# Patient Record
Sex: MALE | Race: WHITE | NOT HISPANIC OR LATINO | URBAN - METROPOLITAN AREA
[De-identification: names, ages, dates, MRNs, and addresses within clinical notes are randomized per-mention and may not be internally consistent; named-entity substitution may affect disease eponyms.]

---

## 2024-08-13 ENCOUNTER — EMERGENCY (EMERGENCY)
Facility: HOSPITAL | Age: 54
LOS: 1 days | End: 2024-08-13
Attending: EMERGENCY MEDICINE
Payer: SELF-PAY

## 2024-08-13 VITALS
HEART RATE: 70 BPM | SYSTOLIC BLOOD PRESSURE: 175 MMHG | TEMPERATURE: 99 F | HEIGHT: 65 IN | DIASTOLIC BLOOD PRESSURE: 90 MMHG | OXYGEN SATURATION: 98 % | RESPIRATION RATE: 18 BRPM | WEIGHT: 154.98 LBS

## 2024-08-13 PROCEDURE — 99283 EMERGENCY DEPT VISIT LOW MDM: CPT

## 2024-08-13 PROCEDURE — 99282 EMERGENCY DEPT VISIT SF MDM: CPT
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
## 2024-08-13 NOTE — ED PROVIDER NOTE - PHYSICAL EXAMINATION
Gen: No acute distress, non toxic  Head: NCAT  Eyes: pink conjunctivae, EOMI, PERRL  CV: RRR, nl s1/s2.  Resp: CTAB, normal rate and effort  Neuro: A&O x 3, sensorimotor intact without deficits   MSK: No midline c/t/l spine ttp or step offs. Full ROM c-spine and ext x 4. FWB and ambulatory with steady gait  Skin: No rashes. intact and perfused. No seatbelt sign, no ecchymosis

## 2024-08-13 NOTE — ED ADULT NURSE NOTE - NSFALLUNIVINTERV_ED_ALL_ED
Bed/Stretcher in lowest position, wheels locked, appropriate side rails in place/Call bell, personal items and telephone in reach/Instruct patient to call for assistance before getting out of bed/chair/stretcher/Non-slip footwear applied when patient is off stretcher/[place] to call system/Physically safe environment - no spills, clutter or unnecessary equipment/Purposeful proactive rounding/Room/bathroom lighting operational, light cord in reach

## 2024-08-13 NOTE — ED ADULT NURSE NOTE - ISOLATION TYPE:
HPI  Esperanza Spangler Sin 34 y.o. male  presents to the office today for CPE. Pt is not fasting. Blood pressure 128/76, pulse (!) 50, temperature 98.4 °F (36.9 °C), temperature source Temporal, resp. rate 16, height 5' 9\" (1.753 m), weight 153 lb (69.4 kg), SpO2 99 %. Body mass index is 22.59 kg/m². Chief Complaint   Patient presents with    Complete Physical      CPE: Pt presents today for a CPE, which I performed. All findings were normal.     Tinea versicolor: Pt has rash on chest that has been present for several years. Attention deficit: Pt reports attention deficit. He has difficulty focusing on things around the house. Health Maintenance:   Pt's unsure when his last tdap was (likely 10 years ago)-- will get booster today. Pt plans to get flu vaccine through his work this month. Pt had COVID booster on 10/20/21 ([facility]). I recommended he get the new booster. Current Outpatient Medications   Medication Sig Dispense Refill    ciclopirox (LOPROX) 0.77 % topical cream Apply  to affected area two (2) times a day. 90 g 2    Cetirizine (ZyrTEC) 10 mg cap Take  by mouth.      multivitamin (ONE A DAY) tablet Take 1 Tab by mouth daily. fish oil-omega-3 fatty acids (Fish Oil) 300-500 mg cap Take  by mouth. No Known Allergies  Past Medical History:   Diagnosis Date    Allergic rhinitis      History reviewed. No pertinent surgical history. Family History   Problem Relation Age of Onset    Elevated Lipids Mother     Asthma Mother     OSTEOARTHRITIS Mother     Elevated Lipids Father     Heart Disease Father      Social History     Tobacco Use    Smoking status: Never    Smokeless tobacco: Never   Substance Use Topics    Alcohol use: Yes     Alcohol/week: 4.0 standard drinks     Types: 1 Glasses of wine, 3 Cans of beer per week        Review of Systems   Constitutional:  Negative for chills and fever. HENT:  Negative for hearing loss and tinnitus.     Eyes:  Negative for blurred vision and double vision. Respiratory:  Negative for cough and shortness of breath. Cardiovascular:  Negative for chest pain and palpitations. Gastrointestinal:  Negative for nausea and vomiting. Genitourinary:  Negative for dysuria and frequency. Musculoskeletal:  Negative for back pain and falls. Skin:  Positive for rash. Negative for itching. Neurological:  Negative for dizziness, loss of consciousness and headaches. Endo/Heme/Allergies: Negative. Psychiatric/Behavioral:  Negative for depression. The patient is not nervous/anxious. Physical Exam  Vitals reviewed. Constitutional:       Appearance: Normal appearance. HENT:      Head: Normocephalic and atraumatic. Right Ear: Tympanic membrane, ear canal and external ear normal.      Left Ear: Tympanic membrane, ear canal and external ear normal.      Nose: Nose normal.      Mouth/Throat:      Mouth: Mucous membranes are moist.      Pharynx: Oropharynx is clear. Eyes:      Extraocular Movements: Extraocular movements intact. Conjunctiva/sclera: Conjunctivae normal.      Pupils: Pupils are equal, round, and reactive to light. Cardiovascular:      Rate and Rhythm: Normal rate and regular rhythm. Pulses: Normal pulses. Heart sounds: Normal heart sounds. Pulmonary:      Effort: Pulmonary effort is normal.      Breath sounds: Normal breath sounds. Abdominal:      General: Abdomen is flat. Bowel sounds are normal.      Palpations: Abdomen is soft. Musculoskeletal:         General: Normal range of motion. Cervical back: Normal range of motion and neck supple. Skin:     General: Skin is warm and dry. Comments: Pt has a hypopigmented rash on chest region. Neurological:      General: No focal deficit present. Mental Status: He is alert and oriented to person, place, and time.    Psychiatric:         Mood and Affect: Mood normal.         Behavior: Behavior normal.         Judgment: Judgment normal.       ASSESSMENT and PLAN  Diagnoses and all orders for this visit:    1. Physical exam  Pt presents today for a CPE, which I performed. All findings were normal. Pt will have updated lab work performed during today's [address]; Future  -     CBC WITH AUTOMATED DIFF; Future  -     LIPID PANEL; Future  -     TSH 3RD GENERATION; Future  -     T4, FREE; Future  -     URINALYSIS W/MICROSCOPIC; Future    2. Tinea versicolor  I advised pt to apply Loprox 0.77% topical cream BID. If refractive will give oral therapy after 4 weeks. -     ciclopirox (LOPROX) 0.77 % topical cream; Apply  to affected area two (2) times a day. 3. Encounter for immunization  Pt's unsure when his last tdap was (likely 10 years ago)-- will get booster today. -     TDAP, BOOSTRIX, (AGE 10 YRS+), IM    4. Attention deficit  We discussed that his attention deficit may be stress related. I referred pt to Dr. Andie Molina for further evaluation.   -     REFERRAL TO NEUROPSYCHOLOGY      Follow-up and Dispositions    Return in about 4 weeks (around 10/31/2022) for follow up. Medication risks/benefits/costs/interactions/alternatives discussed with patient. Advised patient to call back or return to office if symptoms worsen/change/persist.  If patient cannot reach us or should anything more severe/urgent arise he/she should proceed directly to the nearest emergency department. Discussed expected course/resolution/complications of diagnosis in detail with patient. Patient given a written after visit summary which includes diagnoses, current medications and vitals. Patient expressed understanding with the diagnosis and plan. Written by farhad Guevara, as dictated by Saritha Blanton M.D.    3:35 PM - 4:13 PM    Total time spent with the patient 40 minutes, greater than 50% of time spent counseling patient. None
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
## 2024-08-13 NOTE — ED PROVIDER NOTE - PATIENT PORTAL LINK FT
You can access the FollowMyHealth Patient Portal offered by [facility] by registering at the following website: [email or web address]. By joining [employer]’s FollowMyHealth portal, you will also be able to view your health information using other applications (apps) compatible with our system.
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
